# Patient Record
Sex: FEMALE | Race: WHITE
[De-identification: names, ages, dates, MRNs, and addresses within clinical notes are randomized per-mention and may not be internally consistent; named-entity substitution may affect disease eponyms.]

---

## 2017-04-01 ENCOUNTER — HOSPITAL ENCOUNTER (INPATIENT)
Dept: HOSPITAL 47 - EC | Age: 67
LOS: 4 days | Discharge: HOME | DRG: 69 | End: 2017-04-05
Payer: COMMERCIAL

## 2017-04-01 VITALS — BODY MASS INDEX: 41.3 KG/M2

## 2017-04-01 DIAGNOSIS — Z90.710: ICD-10-CM

## 2017-04-01 DIAGNOSIS — Z90.49: ICD-10-CM

## 2017-04-01 DIAGNOSIS — E78.5: ICD-10-CM

## 2017-04-01 DIAGNOSIS — H02.401: ICD-10-CM

## 2017-04-01 DIAGNOSIS — Z82.49: ICD-10-CM

## 2017-04-01 DIAGNOSIS — R20.0: ICD-10-CM

## 2017-04-01 DIAGNOSIS — J30.2: ICD-10-CM

## 2017-04-01 DIAGNOSIS — I10: ICD-10-CM

## 2017-04-01 DIAGNOSIS — Z79.899: ICD-10-CM

## 2017-04-01 DIAGNOSIS — I44.1: ICD-10-CM

## 2017-04-01 DIAGNOSIS — Z87.19: ICD-10-CM

## 2017-04-01 DIAGNOSIS — Z87.898: ICD-10-CM

## 2017-04-01 DIAGNOSIS — R60.0: ICD-10-CM

## 2017-04-01 DIAGNOSIS — R06.00: ICD-10-CM

## 2017-04-01 DIAGNOSIS — R29.810: ICD-10-CM

## 2017-04-01 DIAGNOSIS — E66.9: ICD-10-CM

## 2017-04-01 DIAGNOSIS — H53.8: ICD-10-CM

## 2017-04-01 DIAGNOSIS — Z80.9: ICD-10-CM

## 2017-04-01 DIAGNOSIS — G45.9: Primary | ICD-10-CM

## 2017-04-01 DIAGNOSIS — Z87.891: ICD-10-CM

## 2017-04-01 DIAGNOSIS — T44.7X5A: ICD-10-CM

## 2017-04-01 LAB
ALP SERPL-CCNC: 143 U/L (ref 38–126)
ALT SERPL-CCNC: 17 U/L (ref 9–52)
ANION GAP SERPL CALC-SCNC: 11 MMOL/L
AST SERPL-CCNC: 25 U/L (ref 14–36)
BASOPHILS # BLD AUTO: 0 K/UL (ref 0–0.2)
BASOPHILS NFR BLD AUTO: 1 %
BUN SERPL-SCNC: 17 MG/DL (ref 7–17)
CALCIUM SPEC-MCNC: 10 MG/DL (ref 8.4–10.2)
CH: 31.1
CHCM: 33.6
CHLORIDE SERPL-SCNC: 107 MMOL/L (ref 98–107)
CO2 SERPL-SCNC: 26 MMOL/L (ref 22–30)
EOSINOPHIL # BLD AUTO: 0.1 K/UL (ref 0–0.7)
EOSINOPHIL NFR BLD AUTO: 3 %
ERYTHROCYTE [DISTWIDTH] IN BLOOD BY AUTOMATED COUNT: 4.75 M/UL (ref 3.8–5.4)
ERYTHROCYTE [DISTWIDTH] IN BLOOD: 13 % (ref 11.5–15.5)
GLUCOSE SERPL-MCNC: 98 MG/DL (ref 74–99)
HCT VFR BLD AUTO: 44.2 % (ref 34–46)
HDW: 2.6
HGB BLD-MCNC: 14.7 GM/DL (ref 11.4–16)
INR PPP: 1 (ref ?–1.1)
LUC NFR BLD AUTO: 3 %
LYMPHOCYTES # SPEC AUTO: 1.1 K/UL (ref 1–4.8)
LYMPHOCYTES NFR SPEC AUTO: 23 %
MCH RBC QN AUTO: 30.8 PG (ref 25–35)
MCHC RBC AUTO-ENTMCNC: 33.1 G/DL (ref 31–37)
MCV RBC AUTO: 93.1 FL (ref 80–100)
MONOCYTES # BLD AUTO: 0.4 K/UL (ref 0–1)
MONOCYTES NFR BLD AUTO: 7 %
NEUTROPHILS # BLD AUTO: 3.2 K/UL (ref 1.3–7.7)
NEUTROPHILS NFR BLD AUTO: 64 %
NON-AFRICAN AMERICAN GFR(MDRD): 57
POTASSIUM SERPL-SCNC: 5.1 MMOL/L (ref 3.5–5.1)
PROT SERPL-MCNC: 7.5 G/DL (ref 6.3–8.2)
PT BLD: 9.8 SEC (ref 9–12)
SODIUM SERPL-SCNC: 144 MMOL/L (ref 137–145)
WBC # BLD AUTO: 0.13 10*3/UL
WBC # BLD AUTO: 5 K/UL (ref 3.8–10.6)
WBC (PEROX): 5

## 2017-04-01 PROCEDURE — 83519 RIA NONANTIBODY: CPT

## 2017-04-01 PROCEDURE — 36415 COLL VENOUS BLD VENIPUNCTURE: CPT

## 2017-04-01 PROCEDURE — 80061 LIPID PANEL: CPT

## 2017-04-01 PROCEDURE — 85025 COMPLETE CBC W/AUTO DIFF WBC: CPT

## 2017-04-01 PROCEDURE — 85610 PROTHROMBIN TIME: CPT

## 2017-04-01 PROCEDURE — 80048 BASIC METABOLIC PNL TOTAL CA: CPT

## 2017-04-01 PROCEDURE — 80053 COMPREHEN METABOLIC PANEL: CPT

## 2017-04-01 PROCEDURE — 86255 FLUORESCENT ANTIBODY SCREEN: CPT

## 2017-04-01 PROCEDURE — 93880 EXTRACRANIAL BILAT STUDY: CPT

## 2017-04-01 PROCEDURE — 93005 ELECTROCARDIOGRAM TRACING: CPT

## 2017-04-01 PROCEDURE — 70551 MRI BRAIN STEM W/O DYE: CPT

## 2017-04-01 PROCEDURE — 70544 MR ANGIOGRAPHY HEAD W/O DYE: CPT

## 2017-04-01 PROCEDURE — 93306 TTE W/DOPPLER COMPLETE: CPT

## 2017-04-01 PROCEDURE — 95819 EEG AWAKE AND ASLEEP: CPT

## 2017-04-01 PROCEDURE — 84484 ASSAY OF TROPONIN QUANT: CPT

## 2017-04-01 PROCEDURE — 99285 EMERGENCY DEPT VISIT HI MDM: CPT

## 2017-04-01 PROCEDURE — 70450 CT HEAD/BRAIN W/O DYE: CPT

## 2017-04-01 PROCEDURE — 84443 ASSAY THYROID STIM HORMONE: CPT

## 2017-04-01 RX ADMIN — ASPIRIN 81 MG CHEWABLE TABLET SCH MG: 81 TABLET CHEWABLE at 16:53

## 2017-04-01 RX ADMIN — HYDROCHLOROTHIAZIDE SCH MG: 25 TABLET ORAL at 16:53

## 2017-04-01 RX ADMIN — FAMOTIDINE SCH MG: 20 TABLET, FILM COATED ORAL at 20:29

## 2017-04-01 NOTE — CONS
Mrs. Mario is a 66-year-old female with known history of 

hypertension, hyperlipidemia, who presented with a droopy right eye. 

Cardiology consultation was requested because of episode of 

bradycardia and Wenckebach. Patient denies any dizziness or 

palpitation. She has some mild dyspnea on exertion and some peripheral 

edema at the time. She has no chest discomfort. No PND. No orthopnea. 

She has no prior cardiac history. She was seen recently by Dr. Live 

and she was scheduled to undergo a stress test as an outpatient. She 

had no prior history of stroke but she had Bell palsy in the past. She 

had no associated focal weakness. Her coronary risk factors are 

remarkable for hypertension and hyperlipidemia. She has stopped 

smoking many years ago. She is nondiabetic.  



Her medications include: 

1. Clonidine 0.1 mg daily. 

2. Amlodipine 10 mg daily. 

3. Simvastatin 40 mg daily. 

4. Metoprolol succinate 100 mg daily. 

5. Losartan 100 mg daily. 

6. Loratadine and 

7. Vitamin D. 



REVIEW OF SYSTEMS:

RESPIRATORY: No recent wheezing. No cough. No history of obstructive 

lung disease.  

GI: No recent GI bleeding. No peptic ulcer disease. 

: No dysuria or hematuria. 

NERVOUS: No prior history of stroke or seizure. 



PHYSICAL EXAMINATION: A 66-year-old female; alert, oriented, in no 

apparent distress. Blood pressure 135/70 with a heart rate in the 50s. 

Head with ptosis on the right side.  

EYES: Sclerae anicteric. 

NECK: Good carotid upstroke. No bruit. No jugular venous distention. 

LUNGS: Clear to auscultation. 

HEART: Regular rate and rhythm. S1, S2. No S3 with systolic murmur at 

the base 1/6. No diastolic murmur. No rub.  

ABDOMEN: Soft, nontender. Positive bowel sounds. No organomegaly. 

EXTREMITIES: No edema. Intact distal pulses. 



Lab data revealed BUN and creatinine of 17 and 0.9. Troponin less than 

0.012. Hemoglobin of 14.7. EKG revealed a sinus mechanism with a 

first-degree AV block, rate of 50. Rhythm strip revealed episode of 

Wenckebach. Carotid duplex scan performed showed no evidence of 

high-grade stenosis. CT scan of the chest revealed no acute changes.  



IMPRESSION: 

1. Episode of Wenckebach and first-degree atrioventricular block, most 

likely exacerbated by the beta blocker.  

2. Right eye drooping, possible transient ischemic attack. Workup in 

progress.  

3. History of hypertension. 

4. Hyperlipidemia. 



RECOMMENDATIONS: From the cardiac standpoint, I will decrease the dose 

of her beta blocker and I will add to her regimen a diuretic. Will 

obtain an echocardiogram and Doppler, follow her blood pressure and 

her rhythm. It is very likely that her bradycardia is induced by the 

medication. I will start her on aspirin once a day and depending on 

the results of testing, further recommendations will be made. Thank 

you for this consult. Will follow with you.

## 2017-04-01 NOTE — ED
General Adult HPI





- General


Chief complaint: Eye Problems


Stated complaint: rt side visual disturbance, facial numbness


Time Seen by Provider: 04/01/17 10:08


Source: patient


Mode of arrival: ambulatory


Limitations: no limitations





- History of Present Illness


Initial comments: 





Patient complains of right-sided weakness as well as facial droop.  She states 

that her symptoms began last night.  She has no chest pain, belly pain, back 

pain.  She has no nausea or vomiting.  She has taken no medication for the 

symptoms.  She has no change in her hearing.  She has no neck pain or 

stiffness.  She has no headache.  She has no weakness or trouble walking.  She 

has no lightheadedness or dizziness.





- Related Data


 Home Medications











 Medication  Instructions  Recorded  Confirmed


 


Cyanocobalamin (Vitamin B-12) 2,000 mcg PO SA 05/25/16 05/31/16





[Vitamin B-12]   


 


Loratadine [Loratadine] 10 mg PO DAILY 05/25/16 05/25/16


 


Losartan Potassium [Cozaar] 100 mg PO QAM 05/25/16 05/25/16


 


Naproxen Sodium [Aleve] 220 mg PO DAILY 05/25/16 05/25/16


 


Simvastatin [Zocor] 20 mg PO DAILY 05/25/16 05/25/16


 


amLODIPine BESYLATE [Amlodipine 5 mg PO QAM 05/25/16 05/25/16





Besylate]   











 Allergies











Allergy/AdvReac Type Severity Reaction Status Date / Time


 


No Known Allergies Allergy   Verified 04/01/17 11:52














Review of Systems


ROS Statement: 


Those systems with pertinent positive or pertinent negative responses have been 

documented in the HPI.





ROS Other: All systems not noted in ROS Statement are negative.





Past Medical History


Past Medical History: Hypertension, Liver Disease


Additional Past Medical History / Comment(s): SEASONAL ALLERGIES


History of Any Multi-Drug Resistant Organisms: None Reported


Past Surgical History: Hysterectomy, Orthopedic Surgery


Additional Past Surgical History / Comment(s): RT ANKLE


Past Anesthesia/Blood Transfusion Reactions: No Reported Reaction


Past Psychological History: No Psychological Hx Reported


Smoking Status: Former smoker


Past Alcohol Use History: None Reported


Additional Past Alcohol Use History / Comment(s): SMOKED IN TEENS FOR A COUPLE 

YEARS


Past Drug Use History: None Reported





General Exam


Limitations: no limitations


General appearance: alert, in no apparent distress


Head exam: Present: atraumatic, normocephalic, normal inspection


Eye exam: Present: normal appearance, PERRL, EOMI.  Absent: scleral icterus, 

conjunctival injection, periorbital swelling


ENT exam: Present: normal exam, mucous membranes moist


Neck exam: Present: normal inspection.  Absent: tenderness, meningismus, 

lymphadenopathy


Respiratory exam: Present: normal lung sounds bilaterally.  Absent: respiratory 

distress, wheezes, rales, rhonchi, stridor


Cardiovascular Exam: Present: regular rate, normal rhythm, normal heart sounds.

  Absent: systolic murmur, diastolic murmur, rubs, gallop, clicks


GI/Abdominal exam: Present: soft, normal bowel sounds.  Absent: distended, 

tenderness, guarding, rebound, rigid


Extremities exam: Present: normal inspection, full ROM, normal capillary 

refill.  Absent: tenderness, pedal edema, joint swelling, calf tenderness


Back exam: Present: normal inspection


Neurological exam: Present: alert, oriented X3, other (Slight weakness on the 

right side with facial droop)


Psychiatric exam: Present: normal affect, normal mood


Skin exam: Present: warm, dry, intact, normal color.  Absent: rash





Course





 Vital Signs











  04/01/17 04/01/17





  09:50 10:52


 


Temperature 97.0 F L 


 


Pulse Rate 64 59 L


 


Respiratory 18 14





Rate  


 


Blood Pressure 193/89 130/69


 


O2 Sat by Pulse 96 





Oximetry  














EKG Findings





- EKG Comments:


EKG Findings:: Twelve-lead EKG obtained, interpreted by me showing ventricular 

rate 80 bpm, slightly prolonged DE interval, normal QRS complexes, no ST 

elevation or depression, interpreted by me as sinus bradycardia with first-

degree AV block.





Medical Decision Making





- Medical Decision Making





Patient presents with right-sided weakness, facial droop.  CT shows chronic 

ischemic changes.  I'm concerned for TIA, and the possibility of a severe 

stroke in the near future.  She will require admission to the hospital.





- Lab Data


Result diagrams: 


 04/01/17 10:28





 04/01/17 10:28





 Lab Results











  04/01/17 04/01/17 04/01/17 Range/Units





  10:28 10:28 10:28 


 


WBC  5.0    (3.8-10.6)  k/uL


 


RBC  4.75    (3.80-5.40)  m/uL


 


Hgb  14.7    (11.4-16.0)  gm/dL


 


Hct  44.2    (34.0-46.0)  %


 


MCV  93.1    (80.0-100.0)  fL


 


MCH  30.8    (25.0-35.0)  pg


 


MCHC  33.1    (31.0-37.0)  g/dL


 


RDW  13.0    (11.5-15.5)  %


 


Plt Count  261    (150-450)  k/uL


 


Neutrophils %  64    %


 


Lymphocytes %  23    %


 


Monocytes %  7    %


 


Eosinophils %  3    %


 


Basophils %  1    %


 


Neutrophils #  3.2    (1.3-7.7)  k/uL


 


Lymphocytes #  1.1    (1.0-4.8)  k/uL


 


Monocytes #  0.4    (0-1.0)  k/uL


 


Eosinophils #  0.1    (0-0.7)  k/uL


 


Basophils #  0.0    (0-0.2)  k/uL


 


PT    9.8  (9.0-12.0)  sec


 


INR    1.0  (<1.1)  


 


Sodium   144   (137-145)  mmol/L


 


Potassium   5.1   (3.5-5.1)  mmol/L


 


Chloride   107   ()  mmol/L


 


Carbon Dioxide   26   (22-30)  mmol/L


 


Anion Gap   11   mmol/L


 


BUN   17   (7-17)  mg/dL


 


Creatinine   0.98   (0.52-1.04)  mg/dL


 


Est GFR (MDRD) Af Amer   >60   (>60 ml/min/1.73 sqM)  


 


Est GFR (MDRD) Non-Af   57   (>60 ml/min/1.73 sqM)  


 


Glucose   98   (74-99)  mg/dL


 


Calcium   10.0   (8.4-10.2)  mg/dL


 


Total Bilirubin   0.8   (0.2-1.3)  mg/dL


 


AST   25   (14-36)  U/L


 


ALT   17   (9-52)  U/L


 


Alkaline Phosphatase   143 H   ()  U/L


 


Troponin I     (0.000-0.034)  ng/mL


 


Total Protein   7.5   (6.3-8.2)  g/dL


 


Albumin   4.3   (3.5-5.0)  g/dL














  04/01/17 Range/Units





  10:28 


 


WBC   (3.8-10.6)  k/uL


 


RBC   (3.80-5.40)  m/uL


 


Hgb   (11.4-16.0)  gm/dL


 


Hct   (34.0-46.0)  %


 


MCV   (80.0-100.0)  fL


 


MCH   (25.0-35.0)  pg


 


MCHC   (31.0-37.0)  g/dL


 


RDW   (11.5-15.5)  %


 


Plt Count   (150-450)  k/uL


 


Neutrophils %   %


 


Lymphocytes %   %


 


Monocytes %   %


 


Eosinophils %   %


 


Basophils %   %


 


Neutrophils #   (1.3-7.7)  k/uL


 


Lymphocytes #   (1.0-4.8)  k/uL


 


Monocytes #   (0-1.0)  k/uL


 


Eosinophils #   (0-0.7)  k/uL


 


Basophils #   (0-0.2)  k/uL


 


PT   (9.0-12.0)  sec


 


INR   (<1.1)  


 


Sodium   (137-145)  mmol/L


 


Potassium   (3.5-5.1)  mmol/L


 


Chloride   ()  mmol/L


 


Carbon Dioxide   (22-30)  mmol/L


 


Anion Gap   mmol/L


 


BUN   (7-17)  mg/dL


 


Creatinine   (0.52-1.04)  mg/dL


 


Est GFR (MDRD) Af Amer   (>60 ml/min/1.73 sqM)  


 


Est GFR (MDRD) Non-Af   (>60 ml/min/1.73 sqM)  


 


Glucose   (74-99)  mg/dL


 


Calcium   (8.4-10.2)  mg/dL


 


Total Bilirubin   (0.2-1.3)  mg/dL


 


AST   (14-36)  U/L


 


ALT   (9-52)  U/L


 


Alkaline Phosphatase   ()  U/L


 


Troponin I  <0.012  (0.000-0.034)  ng/mL


 


Total Protein   (6.3-8.2)  g/dL


 


Albumin   (3.5-5.0)  g/dL














Disposition


Clinical Impression: 


 TIA (transient ischemic attack)





Disposition: ADMITTED AS IP TO THIS [facility]


Condition: Fair


Time of Disposition: 11:56

## 2017-04-01 NOTE — CT
EXAMINATION TYPE: CT brain wo con

 

DATE OF EXAM: 4/1/2017 11:09 AM

 

COMPARISON: NONE

 

HISTORY: Rt eye visual disturbance and drooping

 

CT DLP: 1090.4 mGycm

Automated exposure control for dose reduction was used.

 

FINDINGS: 

Central structures are midline. There is no evidence of hydrocephalus. There is diffuse periventricul
ar white matter lucency, compatible with chronic ischemic change. There is no mass effect, midline sh
ift or intracranial blood.

 

There is mucoperiosteal thickening involving the maxillary and ethmoid sinuses.

 

IMPRESSION: 

1. NO ACUTE INTRACRANIAL ABNORMALITY.

2. CHRONIC WHITE MATTER ISCHEMIC CHANGE.

3. CHRONIC SINUS MUCOSAL THICKENING INVOLVING THE MAXILLARY AND ETHMOID SINUSES.

## 2017-04-01 NOTE — P.CNNES
History of Present Illness


Consult date: 04/01/17


Reason for Consult: This patient being evaluated for right eye ptosis and 

stroke.


History of Present Illness: 





This patient is a 66-year-old right hand white female who states that yesterday 

evening she noted some right-sided facial numbness and weakness.  She states 

she awoke this morning and had difficulty opening her right eyelid.  She was 

concerned as to this condition as she has never experienced this previously.  

She initially thought maybe there was an eye infection.  Her symptoms did not 

improve and there was no evidence of any discharge around the right eyelid.  

She did not experience any headache or weakness.  She denied any right-sided 

arm or leg weakness.  She decided to come to the emergency room for further 

evaluation.  She was seen in the ER at [facility] by Dr. Muñoz.

  She was sent for a computed tomography scan of the brain which revealed no 

acute intracranial abnormality.  His chronic white matter ischemic changes 

noted.  EKG in the ER revealed the patient to have first-degree AV block.  When 

questioned about the right sided facial droop yesterday she states this morning 

she did not experience any facial drooping.  Patient denies any previous 

history of TIA or stroke.  She does have a remote history of left-sided Bell's 

palsy years ago.  She is being followed by her primary care physician and 

recently had evaluation and apparently all of her laboratory test results were 

within normal limits.  She also follows with Dr. Mora in the cardiology clinic 

and was scheduled to undergo a stress test in the next few days.  The patient 

is now admitted and neurology has been consulted for further evaluation and 

recommendations.





Review of Systems


Constitutional: Denies chills, Denies fever


Eyes: denies blurred vision, denies pain


Ears, nose, mouth and throat: Denies headache, Denies sore throat


Cardiovascular: Denies chest pain, Denies shortness of breath


Respiratory: Denies cough


Gastrointestinal: Denies abdominal pain, Denies diarrhea, Denies nausea, Denies 

vomiting


Genitourinary: Denies dysuria, Denies hematuria


Musculoskeletal: Denies myalgias


Integumentary: Denies pruritus, Denies rash


Neurological: Denies numbness, Denies weakness


Psychiatric: Denies anxiety, Denies depression


Endocrine: Denies fatigue, Denies weight change





Past Medical History


Past Medical History: Hypertension, Liver Disease


Additional Past Medical History / Comment(s): SEASONAL ALLERGIES, BELLS PALSY 

2002,


History of Any Multi-Drug Resistant Organisms: None Reported


Past Surgical History: Cholecystectomy, Hysterectomy, Orthopedic Surgery


Additional Past Surgical History / Comment(s): RT ANKLE Surgery,


Past Anesthesia/Blood Transfusion Reactions: No Reported Reaction


Past Psychological History: No Psychological Hx Reported


Smoking Status: Never smoker


Past Alcohol Use History: None Reported


Additional Past Alcohol Use History / Comment(s): SMOKED IN TEENS FOR A COUPLE 

YEARS


Past Drug Use History: None Reported





- Past Family History


  ** Father


Family Medical History: Congestive Heart Failure (CHF), Coronary Artery Disease 

(CAD), Hypertension





  ** Mother


Family Medical History: Cancer, Hypertension





Medications and Allergies


 Home Medications











 Medication  Instructions  Recorded  Confirmed  Type


 


Loratadine [Loratadine] 10 mg PO DAILY 05/25/16 04/01/17 History


 


Losartan Potassium [Cozaar] 100 mg PO DAILY 05/25/16 04/01/17 History


 


Ergocalciferol [Vitamin D2] 50,000 unit PO CARRILLO 04/01/17 04/01/17 History


 


Metoprolol Succinate (ER) [Toprol 100 mg PO DAILY 04/01/17 04/01/17 History





Xl]    


 


Simvastatin [Zocor] 40 mg PO DAILY 04/01/17 04/01/17 History


 


amLODIPine [Norvasc] 10 mg PO DAILY 04/01/17 04/01/17 History


 


cloNIDine HCL [Catapres] 0.1 mg PO DAILY 04/01/17 04/01/17 History











 Allergies











Allergy/AdvReac Type Severity Reaction Status Date / Time


 


No Known Allergies Allergy   Verified 04/01/17 11:52














Physical Examination





- Vital Signs


Vital Signs: 


 Vital Signs











  Temp Pulse Pulse Resp BP BP Pulse Ox


 


 04/01/17 16:00  97.2 F L   54 L  16   135/79  95


 


 04/01/17 15:01  97.2 F L   54 L  16   135/79  95


 


 04/01/17 13:52  97.1 F L  51 L   17  142/73  


 


 04/01/17 12:00  98.2 F  50 L   14  142/73   99








 Intake and Output











 04/01/17 04/01/17 04/01/17





 06:59 14:59 22:59


 


Intake Total   10


 


Output Total   300


 


Balance   -290


 


Intake:   


 


  IV   10


 


    Invasive Line 1   10


 


Output:   


 


  Urine   300


 


Other:   


 


  Weight   106 kg








 Patient Weight











 04/02/17





 06:59


 


Weight 106 kg














- Constitutional


General appearance: average body habitus, cooperative, obese





- EENT


EENT: PERRL, mucous membranes moist





- Respiratory


Respiratory: lungs clear, normal breath sounds





- Cardiovascular


Cardiovascular: regular rate, normal S1, normal S2


Extremities: no peripheral edema bilaterally





- Gastrointestinal


Gastrointestinal: normoactive bowel sounds





- Integumentary


Integumentary: normal





- Neurologic


Cranial nerve examination: PERRL (Patient with right eyelid ptosis on 

examination), EOMI, ptosis (Right eyelid ptosis), V1/V2/V3 grossly intact, face 

symmetric, tongue midline, intact gag reflex, intact corneal reflex, normal 

palatal elevation


Speech examination: intact


Sensorimotor examination: intact


Detailed motor examination: grossly full strength in all extremities


Motor examination - right side: 5/5: biceps, triceps, wrist flexion, wrist 

extension, , hip flexors, knee extensors, dorsiflexion, toe extension (EHL)

, plantarflexion


Motor examination - left side: 5/5: biceps, triceps, wrist flexion, wrist 

extension, , hip flexors, knee extensors, dorsiflexion, toe extension (EHL)

, plantarflexion


Detailed sensory examination: intact


Reflex and gait examination: intact


Reflexes: 1+: ankle, bicep, knee, tricep





- Musculoskeletal


Musculoskeletal: no pain





- Psychiatric


Psychiatric: mood/affect appropriate, cooperative





Results





- Laboratory Findings


CBC and BMP: 


 04/01/17 10:28





 04/01/17 10:28





Assessment and Plan


(1) Ptosis, right eyelid


Status: Acute   Code(s): H02.401 - UNSPECIFIED PTOSIS OF RIGHT EYELID   





(2) First degree AV block


Status: Acute   Code(s): I44.0 - ATRIOVENTRICULAR BLOCK, FIRST DEGREE   





(3) Hyperlipidemia


Status: Acute   Code(s): E78.5 - HYPERLIPIDEMIA, UNSPECIFIED   





(4) TIA (transient ischemic attack)


Status: Acute   Code(s): G45.9 - TRANSIENT CEREBRAL ISCHEMIC ATTACK, 

UNSPECIFIED   


Plan: 





This patient is a 66-year-old female who apparently yesterday evening developed 

some right-sided facial droop.  This morning she awoke and noted that she had a 

droopy right eyelid.  She did not experience any facial droop this morning.  

She denied any right arm or leg weakness.  She was brought into the emergency 

room at [facility] for further evaluation today.  She was seen 

in the ER by Dr. Muñoz.  She was sent for computed tomography scan of the brain 

which was negative for any acute abnormality.  She was admitted for possible 

TIA.  She denies any right facial weakness or right sided hemiparesis today.  

She has been more concerned with the droopy eyelid on the right side.  She 

denies any headache at this time.  Her neurological examination is nonfocal at 

this time.  She has no previous history of diabetes or TIA or strokelike 

symptoms.  We would recommend placing this patient on 1 aspirin daily for 

secondary stroke prevention.  We would recommend a complete stroke workup for 

the patient.  We will obtain an MRI of the brain for further evaluation.  Would 

also recommend a cardiology and ophthalmology consultation for this patient.  

Her overall prognosis at this time remains very guarded.  We will continue 

close neurological follow-up of this patient during this admission.


Time with Patient: Greater than 30

## 2017-04-01 NOTE — US
EXAMINATION TYPE: US carotid duplex BILAT

 

DATE OF EXAM: 4/1/2017 2:11 PM

 

COMPARISON: NONE

 

CLINICAL HISTORY: Pain. Left eye droop.  Hx of bells palsy in other side x 15 years ago per patient. 


 

EXAM MEASUREMENTS: 

 

RIGHT:  Peak Systolic Velocity (PSV) cm/sec

----- Right CCA:  54.9  

----- Right ICA:  58.4     

----- Right ECA:  70.3   

ICA/CCA ratio:  1.1    

 

RIGHT:  End Diastole cm/sec

----- Right CCA:  17.4   

----- Right ICA:  19.1      

----- Right ECA:  13.6     

 

LEFT:  Peak Systolic Velocity (PSV) cm/sec

----- Left CCA:  68.6  

----- Left ICA:  40.9   

----- Left ECA:  46.7  

ICA/CCA ratio:  0.6  

 

LEFT:  End Diastole cm/sec

----- Left CCA:  17.1  

----- Left ICA:  9.6   

----- Left ECA:  9.7 

 

VERTEBRALS (direction of flow):

Right Vertebral: Antegrade

Left Vertebral: Antegrade

 

No significant stenosis or high velocities. Plaque seen in right posterior bulb. Bilateral wall thick
ening.   

 

IMPRESSION:  I DO NOT SEE A HEMODYNAMICALLY SIGNIFICANT STENOSIS IN EITHER CAROTID SYSTEM.   

 

 

Criteria for Assigning % of Stenosis / Diameter reduction

(Estimation based on the indirect measurements of the internal carotid artery velocities (ICA PSV).

1.  Normal (no stenosis)=ICA PSV < 125 cm/s: ratio < 2.0: ICA EDV<40 cm/s.

2. Less than 50% stenosis=ICA PSV < 125 cm/s: ratio < 2.0: ICA EDV<40 cm/s.

3.  50 to 69% stenosis=ICA PSV of 125 to 230 cm/s: ration 2.0 ? 4.0: ICA EDV  cm/s.

4.  Greater than 70% stenosis to near occlusion= ICA PSV > 230 cm/s: ratio > 4.0: ICA EDV > 100 cm/s.
 

5.  Near occlusion= ICA PSV velocities may be low or undetectable: variable ratio and ICA EDV.

6.  Total occlusion=unable to detect flow.

## 2017-04-02 LAB
ANION GAP SERPL CALC-SCNC: 12 MMOL/L
BUN SERPL-SCNC: 15 MG/DL (ref 7–17)
CALCIUM SPEC-MCNC: 10.1 MG/DL (ref 8.4–10.2)
CHLORIDE SERPL-SCNC: 105 MMOL/L (ref 98–107)
CHOLEST SERPL-MCNC: 164 MG/DL (ref ?–200)
CO2 SERPL-SCNC: 25 MMOL/L (ref 22–30)
GLUCOSE SERPL-MCNC: 105 MG/DL (ref 74–99)
HDLC SERPL-MCNC: 68 MG/DL (ref 40–60)
NON-AFRICAN AMERICAN GFR(MDRD): 55
POTASSIUM SERPL-SCNC: 4.8 MMOL/L (ref 3.5–5.1)
SODIUM SERPL-SCNC: 142 MMOL/L (ref 137–145)
TRIGL SERPL-MCNC: 136 MG/DL (ref ?–150)

## 2017-04-02 RX ADMIN — ASPIRIN 81 MG CHEWABLE TABLET SCH MG: 81 TABLET CHEWABLE at 09:29

## 2017-04-02 RX ADMIN — LOSARTAN POTASSIUM SCH MG: 50 TABLET, FILM COATED ORAL at 09:30

## 2017-04-02 RX ADMIN — FAMOTIDINE SCH MG: 20 TABLET, FILM COATED ORAL at 09:30

## 2017-04-02 RX ADMIN — ENOXAPARIN SODIUM SCH MG: 40 INJECTION SUBCUTANEOUS at 09:29

## 2017-04-02 RX ADMIN — METOPROLOL SUCCINATE SCH MG: 100 TABLET, EXTENDED RELEASE ORAL at 09:32

## 2017-04-02 RX ADMIN — FAMOTIDINE SCH MG: 20 TABLET, FILM COATED ORAL at 20:40

## 2017-04-02 RX ADMIN — ATORVASTATIN CALCIUM SCH MG: 20 TABLET, FILM COATED ORAL at 09:30

## 2017-04-02 RX ADMIN — HYDROCHLOROTHIAZIDE SCH MG: 25 TABLET ORAL at 09:31

## 2017-04-02 NOTE — P.PN
Subjective





This patient is a 66-year-old female who was admitted yesterday to Hospital 

with symptoms of right eyelid ptosis and blurring of vision.  Patient initially 

had some symptoms of right facial numbness and questionable weakness.  She was 

brought into the emergency room where she was evaluated by Dr. Muñoz.  She 

underwent a computed tomography scan of the brain which was reported negative 

for any acute changes.  She was admitted to hospital for further evaluation of 

TIA.  Patient also developed evidence of bradycardia and AV block.  She was 

seen by cardiology for this condition.  She had an episode of blinking block 

and first-degree AV block likely felt to be secondary to beta blocker use.  Her 

dose of beta blocker has been reduced.  She is being followed closely by 

cardiology.  Patient presented with right eyelid ptosis.  We have recommended 

MRI of the brain to rule out brainstem ischemia.  We have also recommended 

ophthalmology consultation which is still pending today.  Patient states she 

was seen by the ophthalmologist today and we are awaiting his assessment and 

recommendations.  Patient is to be maintained on aspirin for secondary stroke 

prevention.  Cardiology is following the patient for first-degree heart block.  

Her cardiac medications are being adjusted by Dr. Potter.  Patient does show 

slight improvement in the degree of ptosis of the right eyelid.  We will 

continue close neurological follow-up for this patient during this admission.





Objective





- Vital Signs


Vital signs: 


 Vital Signs











Temp  97.1 F L  04/02/17 11:41


 


Pulse  64   04/02/17 11:41


 


Resp  16   04/02/17 11:41


 


BP  136/93   04/02/17 11:41


 


Pulse Ox  93 L  04/02/17 11:41








 Intake & Output











 04/01/17 04/02/17 04/02/17





 18:59 06:59 18:59


 


Intake Total 10 300 100


 


Output Total 300 400 


 


Balance -290 -100 100


 


Weight 106 kg 104.1 kg 


 


Intake:   


 


  IV 10  


 


    Invasive Line 1 10  


 


  Oral  300 100


 


Output:   


 


  Urine 300 400 


 


Other:   


 


  # Voids  1 0














- Exam





Physical examination:





PHYSICAL EXAMINATION: Patient is resting comfortably in bed. 


VITAL SIGNS: Blood pressure is [136/93]. Heart rate is [64]. Respiration is [16]

. Temperature is [97.1].


HEENT: Head is atraumatic, neck is supple, there were no carotid bruits.


CHEST: Lungs are clear to auscultation and percussion.  


CARDIAC: S1, S2 normal rate and rhythm. There is no murmur.


ABDOMEN: Soft and nontender. Bowel sounds are present.


EXTREMITIES:  There is no pedal edema.  Peripheral pulses are present.





Neurological examination:





Patient's neurological examination is unchanged from yesterday.





- Labs


CBC & Chem 7: 


 04/01/17 10:28





 04/02/17 06:12


Labs: 


 Abnormal Lab Results - Last 24 Hours (Table)











  04/02/17 Range/Units





  06:12 


 


Glucose  105 H  (74-99)  mg/dL


 


HDL Cholesterol  68 H  (40-60)  mg/dL














Assessment and Plan


(1) Ptosis, right eyelid


Status: Acute   Code(s): H02.401 - UNSPECIFIED PTOSIS OF RIGHT EYELID   





(2) First degree AV block


Status: Acute   Code(s): I44.0 - ATRIOVENTRICULAR BLOCK, FIRST DEGREE   





(3) Hyperlipidemia


Status: Acute   Code(s): E78.5 - HYPERLIPIDEMIA, UNSPECIFIED   





(4) TIA (transient ischemic attack)


Status: Acute   Code(s): G45.9 - TRANSIENT CEREBRAL ISCHEMIC ATTACK, 

UNSPECIFIED   


Plan: 





This patient is a 66-year-old female being evaluated for acute right eyelid 

ptosis and possible vertebrobasilar ischemia.  She is to undergo MRI of the 

brain tomorrow for further evaluation.  She was seen by ophthalmology today and 

we are waiting there findings and recommendations.  Patient notices slight 

improvement with the degree of ptosis in the right eye.  She states that it 

does seem to wax and wane throughout the day.  She denies any diplopia.  We 

will continue close neurological follow-up for this patient during this 

admission.  Hopefully MRI of the brain will be completed tomorrow and will be 

reviewed.  Overall prognosis at this time remains guarded.

## 2017-04-02 NOTE — P.HPIM
History of Present Illness


H&P Date: 04/02/17


Chief Complaint: Acute stroke





Patient is a 66-year-old female patient of Dr. Awilda Flannery who presented to 

[facility] after experiencing right sided facial numbness and 

weakness and having difficulty opening her right eyelid she was evaluated in 

the emergency room computed tomography scan of the brain did not reveal any 

acute intracranial abnormality patient had chronic white matter ischemic 

changes patient was admitted to telemetry floor for possible TIA.


Patient has known history of hypertension and hyperlipidemia treated with 

medications, no previous history of stroke or TIA in the past, no history of 

smoking no history of diabetes, patient has a history of left sided Bell's 

palsy 15 years ago.





Past Medical History


Past Medical History: Hypertension, Liver Disease


Additional Past Medical History / Comment(s): SEASONAL ALLERGIES, BELLS PALSY 

2002,


History of Any Multi-Drug Resistant Organisms: None Reported


Past Surgical History: Cholecystectomy, Hysterectomy, Orthopedic Surgery


Additional Past Surgical History / Comment(s): RT ANKLE Surgery,


Past Anesthesia/Blood Transfusion Reactions: No Reported Reaction


Past Psychological History: No Psychological Hx Reported


Smoking Status: Never smoker


Past Alcohol Use History: None Reported


Additional Past Alcohol Use History / Comment(s): SMOKED IN TEENS FOR A COUPLE 

YEARS


Past Drug Use History: None Reported





- Past Family History


  ** Father


Family Medical History: Congestive Heart Failure (CHF), Coronary Artery Disease 

(CAD), Hypertension





  ** Mother


Family Medical History: Cancer, Hypertension





Medications and Allergies


 Home Medications











 Medication  Instructions  Recorded  Confirmed  Type


 


Loratadine [Loratadine] 10 mg PO DAILY 05/25/16 04/01/17 History


 


Losartan Potassium [Cozaar] 100 mg PO DAILY 05/25/16 04/01/17 History


 


Ergocalciferol [Vitamin D2] 50,000 unit PO CARRILLO 04/01/17 04/01/17 History


 


Metoprolol Succinate (ER) [Toprol 100 mg PO DAILY 04/01/17 04/01/17 History





Xl]    


 


Simvastatin [Zocor] 40 mg PO DAILY 04/01/17 04/01/17 History


 


amLODIPine [Norvasc] 10 mg PO DAILY 04/01/17 04/01/17 History


 


cloNIDine HCL [Catapres] 0.1 mg PO DAILY 04/01/17 04/01/17 History











 Allergies











Allergy/AdvReac Type Severity Reaction Status Date / Time


 


No Known Allergies Allergy   Verified 04/01/17 11:52














Physical Exam


Vitals: 


 Vital Signs











  Temp Pulse Pulse Resp BP BP Pulse Ox


 


 04/02/17 04:00  97.0 F L   60  16   167/81  95


 


 04/02/17 00:00    54 L  18   158/72  93 L


 


 04/01/17 20:00  97.1 F L   56 L  16   141/75  93 L


 


 04/01/17 16:00  97.2 F L   54 L  16   135/79  95


 


 04/01/17 15:01  97.2 F L   54 L  16   135/79  95


 


 04/01/17 13:52  97.1 F L  51 L   17  142/73  


 


 04/01/17 12:00  98.2 F  50 L   14  142/73   99








 Intake and Output











 04/01/17 04/02/17 04/02/17





 22:59 06:59 14:59


 


Intake Total 10 300 100


 


Output Total 700  


 


Balance -690 300 100


 


Intake:   


 


  IV 10  


 


    Invasive Line 1 10  


 


  Oral 0 300 100


 


Output:   


 


  Urine 700  


 


Other:   


 


  # Voids  1 0


 


  Weight 106 kg 104.1 kg 














In general patient is alert and oriented 3 in no apparent distress


HEENT head normocephalic and atraumatic there is partial opening of the right 

eye lid patient is unable to open her right eye related completely, and when 

she helps the her upper right eyelid output with the use of her hand she 

complains of blurred vision in the right eye.


Neck is supple no JVD no goiter no lymphadenopathy no carotid bruit


Chest exam reveals a few scattered crackles no wheezing


Cardiac exam reveals regular heart sounds S1 and S2 no gallops no murmurs


Abdomen is soft nontender no organomegaly


Extremity exam reveals no edema no cyanosis or clubbing


Neurological examination there is right eye upper eyelid ptosis, there is also 

blurred vision in the right eye


Otherwise rest of cranial nerves are intact


No motor or sensory deficit


Reflexes are 2+ symmetric





Results


CBC & Chem 7: 


 04/01/17 10:28





 04/02/17 06:12


Labs: 


 Abnormal Lab Results - Last 24 Hours (Table)











  04/02/17 Range/Units





  06:12 


 


Glucose  105 H  (74-99)  mg/dL


 


HDL Cholesterol  68 H  (40-60)  mg/dL














Thrombosis Risk Factor Assmnt





- Choose All That Apply


Each Factor Represents 1 point: Obesity (BMI >25)


Each Risk Factor Represents 2 Points: Age 61-74 years


Other congenital or acquired thrombophilia - If yes, enter type in comment: No


Thrombosis Risk Factor Assessment Total Risk Factor Score: 3


Thrombosis Risk Factor Assessment Level: Moderate Risk





Assessment and Plan


Plan: 





#1 acute stroke with right upper eyelid ptosis


#2 underlying history of hypertension


#3 underlying history of hyperlipidemia


#4 cardiac arrhythmia in the form of first-degree AV block and second degree 

Wenckebach


#5 remote history of Bell's palsy on the left side


#6 blurred vision in the right eye





At this time echocardiogram and carotid Doppler were ordered patient was 

started on aspirin


Cardiology and neurology consultation are following


MRI of the brain is ordered but not performed yet


Will ask ophthalmology to evaluate


Continue with current management otherwise


Prognosis is guarded

## 2017-04-02 NOTE — P.CON
Consult Note





- .


Consult date: 04/02/17


Assessment/Plan:: 





This is a 65 y/o female who presented herself to the emergency room after 

awakening with a right eyelid ptosis without diplopia and facial droop. 

According to she and her daughter the facial weakness was limited to the eyelid 

and the periocular area. There was no claim of other musculoskeletal weakness, 

loss of sensation, inhibition of speech, vertigo, tinnitus or difficulty 

recalling her surroundings. She felt her vision on the right was slightly 

poorer than on the left. 





She states that she underwent a dilated eye examination in February for safety 

glasses, as she's working in a plastics factory, and denies any noted problems 

by the optometrist who performd the examination. She also had a physical 

examination with her medical physician Dr. Flannery, and the problem noted at 

that time was elevated blood pressure which has been difficult to treat. She 

was referred to cardiology for further assessment and intervention as needed. 

An obtained EKG was normal as she states. And she does not recall any prior 

history of any cardiac conduction defects. 





Her past ocular history is largely unremarkable except for the requirement to 

have glasses for work. She denies any previous history of amblyopia, surgery, 

glaucoma or retinal problems. She had a left sided Bell's palsy some years ago 

which did not require even the use of artificial tears or other intervention to 

control for drying of the ocular surfaces, as often happens. She does note 

there is a mild residual effect of a slightly wider appearing eye on the left 

side. She admits to feeling somewhat more tired after a hard and hot day at 

work more so than on cooler days





On examination she is a pleasant woman who is well aware of her surroundings 

and able to provide a coherent history. She is cooperative with the examination 

as well. 





Vision with current glasses was 20/25 OD, and 20/20 OS. IOP 8 mm Hg OD, 10 mm 

Hg OS @1200 hrs. Pupils -APD, 4 to 3 mm both direct and consensual. EOM full D&V

, orthophoric


Confrontational fields full bilaterally. Eyelids, ptosis partial is noted on 

the right, Fissues are 4 mm right 7 mm left. MRD -1 mm OD, +3 mm OS. Good upper 

lid crease no excessive retraction and levator function 13 mm right, 17 mm 

left. There is noted weakness on spontaneous lifting of the eyelid on the right 

and slight weakness of closure on the left. No fatiguing of the eyelid muscles 

was noted when attempting to be opened while be held closed, and no increased 

weakness noted on closure while being held open. There was no improvement in 

eyelid ptosis when an ice cube was held against the eyelid for 90 seconds. The 

balance of CN II to CN VII appeared otherwise to be normal in function.





A nondilated examination of the eye was performed. 





Cornea: clear OU


AC: D&Q OU


Iris: similar in pigmentation and without obvious pathology


Lens: clear


Vitreous: Clear


Optic nerve: Sharp, flat & pink


C:D: 0.20 OU


Macula: quiet


Vascular: Normal


Peripheral: grossly normal, without pigmentary changes





Assessment: Ptosis right upper eyelid acute, No obvious signs of underlying 

cause There does not seem to be any additional CN III involvement, or proximal 

limb weakness. She did not respond to ice to eyelid if there was some question 

of myasthenia gravis, however admittedly this is not the definitive test. I am 

not detecting any signs of CPEO and therefore not really considering [facility]

[place] here. I suspect this to possibly be a form of dehissence and measurements 

for this are better made in the office. 





Plan: Proceed with workup of the underlying problem, better control of systemic 

issues, and will follow while hospitalized.

## 2017-04-02 NOTE — PN
Mrs. Mario is a 66-year-old female who presented with symptoms of 

drooping in the right eye and mild blurriness.  She had first degree 

AV block and an episode of Wenckebach.  Her beta blocker dose was 

decreased. She still has some drooping of the eye. She is denying any 

chest pain.  



Her breathing has been stable. She has been ambulating without 

difficulty. Had no further episode of bradycardia. She has been 

continued on: 

1. Aspirin.

2. Lipitor 20 mg daily.

3. Hydrochlorothiazide 25 mg daily.

4. Losartan 100 mg daily.

5. Metoprolol succinate 50 mg daily.

6. Amlodipine 10 mg daily.

7. Clonidine 0.1 mg daily. 



PHYSICAL EXAMINATION: Blood pressure 136/90 with a heart rate in the 

60s.  

LUNGS: Clear. 

HEART: Regular rate and rhythm. S1, S2, no S3, no rub.  With a 

systolic murmur.  

ABDOMEN: Soft, nontender. 

EXTREMITIES: No edema. 



Lab data revealed BUN and creatinine 15 and 1.01. Her potassium 4.8. 



IMPRESSION:

1. Drooping of the right eye with blurriness, work-up in progress. 

2. Hypertension. 

3. First degree AV block and episode of Wenckebach improved with 

cutting down the dose of beta blocker.  

4. Hypertension. 

5. Hyperlipidemia. 



RECOMMENDATIONS: Will continue to monitor her blood pressure. If her 

pressure is stable, then I will stop her clonidine continue medical 

regimen. We will obtain an echocardiogram with Doppler and depending 

on her progress, further recommendation will be made.

## 2017-04-03 RX ADMIN — METOPROLOL SUCCINATE SCH MG: 100 TABLET, EXTENDED RELEASE ORAL at 08:31

## 2017-04-03 RX ADMIN — LOSARTAN POTASSIUM SCH MG: 50 TABLET, FILM COATED ORAL at 08:30

## 2017-04-03 RX ADMIN — ENOXAPARIN SODIUM SCH MG: 40 INJECTION SUBCUTANEOUS at 08:30

## 2017-04-03 RX ADMIN — HYDROCHLOROTHIAZIDE SCH MG: 25 TABLET ORAL at 08:30

## 2017-04-03 RX ADMIN — ATORVASTATIN CALCIUM SCH MG: 20 TABLET, FILM COATED ORAL at 08:29

## 2017-04-03 RX ADMIN — FAMOTIDINE SCH MG: 20 TABLET, FILM COATED ORAL at 08:30

## 2017-04-03 RX ADMIN — ASPIRIN 81 MG CHEWABLE TABLET SCH MG: 81 TABLET CHEWABLE at 08:29

## 2017-04-03 NOTE — MR
EXAMINATION TYPE: MR brain wo con

 

DATE OF EXAM: 4/3/2017 8:08 PM

 

COMPARISON: 11/21/2012

 

HISTORY: Right eye ptosis

 

Standard multiplanar, multisequence MRI departmental protocol

 

Multiplanar, multisequence images of the brain were acquired. Diffusion weighted imaging was performe
d.  

 

FINDINGS: There are patchy areas of abnormal increased signal in the white matter around the occipita
l horns of the lateral ventricles. There is to lesser extent white matter foci that measure up to 5 m
m of increased signal around the remainder of the lateral ventricles. The brainstem is intact. There 
is no evidence of a posterior fossa mass. Sella turcica appears normal. Corpus callosum appears intac
t. The globes are symmetric. There is no evidence of an orbital mass. There are scattered high signal
 white matter foci on the T2 and FLAIR images at the gray-white matter junction of both temporal lobe
s and both occipital lobes.

 

IMPRESSION: 

There are numerous white matter high signal foci in both cerebral hemispheres. I would consider both 
demyelinating disease and chronic small vessel ischemia. There is slight progression of disease hayden
red to the previous MR scan of 11/21/2012. Total number of white matter lesions is more than 30. No e
vidence of a cortical infarct.

## 2017-04-03 NOTE — P.PN
Subjective


Principal diagnosis: 





Right eye ptosis





Patient is a 66-year-old female presenting with right eye ptosis, and blurred 

vision in the right eye


Cause is still unclear


Patient was evaluated by neurology and ophthalmology


She is scheduled for MRI today at 7 PM





Objective





- Vital Signs


Vital signs: 


 Vital Signs











Temp  97.5 F L  04/03/17 08:00


 


Pulse  66   04/03/17 12:00


 


Resp  16   04/03/17 12:00


 


BP  115/58   04/03/17 12:00


 


Pulse Ox  93 L  04/03/17 12:00








 Intake & Output











 04/02/17 04/03/17 04/03/17





 18:59 06:59 18:59


 


Intake Total 240 310 400


 


Output Total   250


 


Balance 240 310 150


 


Weight  103.8 kg 


 


Intake:   


 


  IV 10 10 


 


    Invasive Line 2 10 10 


 


  Oral 230 300 400


 


Output:   


 


  Urine   250


 


Other:   


 


  # Voids 1 1 














- Exam





HEENT head normocephalic and atraumatic, there is ptosis in the right eye, 

patient states she has blurry vision in her right eye


Neck is supple no JVD no goiter no lymphadenopathy


Chest is clear to auscultation no wheezing


Cardiac exam reveals regular heart sounds no murmurs


Abdomen is soft nontender no organomegaly


Extremity exam reveals no edema





- Labs


CBC & Chem 7: 


 04/01/17 10:28





 04/02/17 06:12





Assessment and Plan


Plan: 





#1 acute stroke with right upper eyelid ptosis


#2 underlying history of hypertension


#3 underlying history of hyperlipidemia


#4 cardiac arrhythmia in the form of first-degree AV block and second degree 

Wenckebach


#5 remote history of Bell's palsy on the left side


#6 blurred vision in the right eye





At this time echocardiogram and carotid Doppler were ordered patient was 

started on aspirin


Cardiology and neurology consultation are following


MRI of the brain is scheduled for 7 PM today


Input from Dr. Torres ophthalmologist review


Continue with current management otherwise


Prognosis is guarded

## 2017-04-03 NOTE — MR
EXAMINATION TYPE: MR angio head wo con

 

DATE OF EXAM: 4/3/2017 8:09 PM

 

COMPARISON: NONE

 

HISTORY: Right eyelid ptosis

 

TECHNIQUE: Time of flight images focusing on the [place] were performed without contrast.

 

FINDINGS: Internal carotid arteries bifurcate normally into A1 and M1 segments. Middle cerebral arter
ies branch normally without evidence of aneurysm. Distal middle cerebral artery branches appear unrem
arkable. A2 segments appear unremarkable.

 

Anterior communicating artery is patent. The right posterior communicating artery is patent. Left pos
terior communicating artery is not identified.

 

Vertebral basilar system appears normal. Posterior cerebral vasculature appears normal.

 

IMPRESSION: 

Normal MRA [place].

## 2017-04-03 NOTE — P.PN
Subjective


Principal diagnosis: 


Second-degree type I heart block


This is a 66-year-old female who presented to the hospital with right eye 

droopiness and mild blurred vision.  Patient was noted to have a first-degree 

AV block on the monitor and an episode of wide keep.  She was seen in 

consultation by Dr. Potter.  Echocardiogram with Doppler study was performed 

which revealed normal left ventricular systolic function.  Patient has been up 

ambulating in the hallway today without any difficulty.  Remaining in normal 

sinus rhythm with first-degree AV block heart rate in the 60s.  Blood pressure 

126/90.  We will discontinue the clonidine, continue her other medications.








Objective





- Vital Signs


Vital signs: 


 Vital Signs











Temp  97.5 F L  04/03/17 08:00


 


Pulse  66   04/03/17 12:00


 


Resp  16   04/03/17 12:00


 


BP  115/58   04/03/17 12:00


 


Pulse Ox  93 L  04/03/17 12:00








 Intake & Output











 04/02/17 04/03/17 04/03/17





 18:59 06:59 18:59


 


Intake Total 240 310 400


 


Output Total   250


 


Balance 240 310 150


 


Weight  103.8 kg 


 


Intake:   


 


  IV 10 10 


 


    Invasive Line 2 10 10 


 


  Oral 230 300 400


 


Output:   


 


  Urine   250


 


Other:   


 


  # Voids 1 1 














- Exam





PHYSICAL EXAMINATION: 





HEENT: Head is atraumatic, normocephalic.  Pupils equal, round.  Drooping of 

the right eyelid noted  Neck is supple.  There is no elevated jugular venous 

pressure.





HEART EXAMINATION: Heart S1, S2 normal.  No murmur or gallop heard.





CHEST EXAMINATION: Lungs are clear to auscultation and precussion. No chest 

wall tenderness is noted on palpation or with deep breathing.





ABDOMEN:  Soft, nontender. Bowel sounds are heard. No organomegaly noted.


 


EXTREMITIES: 2+ peripheral pulses with no evidence of peripheral edema and no 

calf tenderness noted.





NEUROLOGIC patient is awake, alert and oriented -3.


 


.


 








- Labs


CBC & Chem 7: 


 04/01/17 10:28





 04/02/17 06:12





Assessment and Plan


(1) Wenckebach


Status: Acute   





(2) First degree AV block


Status: Acute   





(3) Hyperlipidemia


Status: Acute   





(4) Ptosis, right eyelid


Status: Acute   


Plan: 


Cardiology's perspective, we will discontinue the clonidine.  Continue other 

medications.  On discharge a follow-up appointment will be made with Dr. Potter 

in the office.








DNP note has been reviewed, I agree with a documented findings and plan of 

care.  Patient was seen and examined.

## 2017-04-03 NOTE — ECHOF
Referral Reason:htn



MEASUREMENTS

--------

HEIGHT: 160.0 cm

WEIGHT: 103.9 kg

BP: 163/89

RVIDd:   3.1 cm     (< 3.3)

IVSd:   1.0 cm     (0.6 - 1.1)

LVIDd:   3.7 cm     (3.9 - 5.3)

LVPWd:   1.2 cm     (0.6 - 1.1)

IVSs:   1.5 cm

LVIDs:   3.5 cm

LVPWs:   1.1 cm

LA Diam:   3.3 cm     (2.7 - 3.8)

LAESV Index (A-L):   25.93 ml/m

Ao Diam:   3.4 cm     (2.0 - 3.7)

AV Cusp:   1.5 cm     (1.5 - 2.6)

LA Diam:   3.7 cm     (2.7 - 3.8)

MV EXCURSION:   14.751 mm     (> 18.000)

MV EF SLOPE:   66 mm/s     (70 - 150)

EPSS:   0.4 cm

RAP:   5.00 mmHg

RVSP:   12.04 mmHg







FINDINGS

--------

Sinus rhythm.

This was a technically adequate study.

There is mild concentric left ventricular hypertrophy.  

 Overall left ventricular systolic function is 

low-normal with, an EF between 50 - 55 %.

The right ventricle is normal in size.

Normal LA  size by volume 22+/-6 ml/m2.

The right atrial size is normal.

There is mild aortic valve sclerosis.   There is no 

evidence of aortic regurgitation.

Mild mitral annular calcification present.   Mild 

mitral regurgitation is present.

Mild tricuspid regurgitation present.   There is no 

evidence of pulmonary hypertension.   The right 

ventricular systolic pressure, as measured by Doppler, 

is 12.04mmHg.

There is no pulmonic regurgitation present.

The aortic root size is normal.

There is no pericardial effusion.



CONCLUSIONS

--------

1. There is mild concentric left ventricular hypertrophy.

2. Overall left ventricular systolic function is 

low-normal with, an EF between 50 - 55 %.

3. There is mild aortic valve sclerosis.

4. Mild mitral annular calcification present.

5. Mild mitral regurgitation is present.

6. Mild tricuspid regurgitation present.

7. There is no evidence of pulmonary hypertension.

8. The right ventricular systolic pressure, as measured by 

Doppler, is 12.04mmHg.





SONOGRAPHER: Joan Shah RDCS

## 2017-04-03 NOTE — P.PN
Subjective





This patient is a 66-year-old female who was admitted yesterday to Hospital 

with symptoms of right eyelid ptosis and blurring of vision.  Patient initially 

had some symptoms of right facial numbness and questionable weakness.  She was 

brought into the emergency room where she was evaluated by Dr. Muñoz.  She 

underwent a computed tomography scan of the brain which was reported negative 

for any acute changes.  She was admitted to hospital for further evaluation of 

TIA.  Patient also developed evidence of bradycardia and AV block.  She was 

seen by cardiology for this condition.  She had an episode of blinking block 

and first-degree AV block likely felt to be secondary to beta blocker use.  Her 

dose of beta blocker has been reduced.  She is being followed closely by 

cardiology.  Patient presented with right eyelid ptosis.  We have recommended 

MRI of the brain to rule out brainstem ischemia.  We have also recommended 

ophthalmology consultation which is still pending today.  Patient states she 

was seen by the ophthalmologist today and we are awaiting his assessment and 

recommendations.  Patient is to be maintained on aspirin for secondary stroke 

prevention.  Cardiology is following the patient for first-degree heart block.  

Her cardiac medications are being adjusted by Dr. Potter.  Patient does show 

slight improvement in the degree of ptosis of the right eyelid.  Patient was 

able to complete MRI of the brain today.  Results of the MRI indicate scattered 

white matter ischemic changes bilaterally.  No evidence of acute stroke.  Brain 

STEM is normal with no evidence of brainstem stroke.  MRA of the [place] is reported normal.  Results of the MRI MRA were reviewed today with the 

patient.  We have reviewed the recommendations from ophthalmology.  It is felt 

her findings more likely are due to local weakness of the right eye ocular 

muscles and will require outpatient ophthalmology follow-up.  Patient should be 

maintained on aspirin therapy for secondary stroke prevention.  We will 

continue close neurological follow-up for this patient during this admission.





Objective





- Vital Signs


Vital signs: 


 Vital Signs











Temp  97.0 F L  04/03/17 20:00


 


Pulse  68   04/03/17 20:00


 


Resp  16   04/03/17 20:00


 


BP  135/74   04/03/17 20:00


 


Pulse Ox  94 L  04/03/17 20:00








 Intake & Output











 04/03/17 04/03/17 04/04/17





 06:59 18:59 06:59


 


Intake Total 310 637 


 


Output Total  250 


 


Balance 310 387 


 


Weight 103.8 kg  


 


Intake:   


 


  IV 10  


 


    Invasive Line 2 10  


 


  Oral 300 637 


 


Output:   


 


  Urine  250 


 


Other:   


 


  Voiding Method   Toilet


 


  # Voids 1  














- Exam





Physical examination:





PHYSICAL EXAMINATION: Patient is resting comfortably in bed. 


VITAL SIGNS: Blood pressure is [135/74]. Heart rate is [68]. Respiration is [16]

. Temperature is [97.0].


HEENT: Head is atraumatic, neck is supple, there were no carotid bruits.


CHEST: Lungs are clear to auscultation and percussion.  


CARDIAC: S1, S2 normal rate and rhythm. There is no murmur.


ABDOMEN: Soft and nontender. Bowel sounds are present.


EXTREMITIES:  There is no pedal edema.  Peripheral pulses are present.





Neurological examination:





Patient's neurological examination is unchanged from yesterday.





- Labs


CBC & Chem 7: 


 04/01/17 10:28





 04/02/17 06:12





Assessment and Plan


(1) Ptosis, right eyelid


Status: Acute   Code(s): H02.401 - UNSPECIFIED PTOSIS OF RIGHT EYELID   





(2) First degree AV block


Status: Acute   Code(s): I44.0 - ATRIOVENTRICULAR BLOCK, FIRST DEGREE   





(3) Hyperlipidemia


Status: Acute   Code(s): E78.5 - HYPERLIPIDEMIA, UNSPECIFIED   





(4) TIA (transient ischemic attack)


Status: Acute   Code(s): G45.9 - TRANSIENT CEREBRAL ISCHEMIC ATTACK, 

UNSPECIFIED   


Plan: 





This patient is a 66-year-old female who was admitted hospital with right 

eyelid ptosis.  Patient underwent MRI/MRA of the brain today the results of 

which are noted above.  MRI is negative for acute brainstem stroke or acute 

cortical stroke at this time.  Multiple chronic white matter ischemic changes 

are seen bilaterally.  Patient is recommended to continue on aspirin therapy 

for secondary stroke prevention.  MRA is normal with no evidence of any 

aneurysm.  We reviewed the results of the MRI/MRA with the patient today.  She 

is recommended to continue to follow-up with ophthalmology.  Ophthalmology 

consultation was noted today.  There is possibility that this may be a 

localized muscle this is since involving the superior orbital muscle of the 

right eye.  She is to follow-up with ophthalmology in outpatient clinic.  We 

will continue close neurological follow-up for this patient.  Overall prognosis 

at this time remains guarded.

## 2017-04-03 NOTE — P.PN
Subjective


Principal diagnosis: 





Right eye ptosis





67 y/o female with acute onset of right upper lid ptosis admitted for 

cerebrovascular accident etiology.





On exam to day she is not noting any improvement in the eyelid position and 

feels the "blurry" vision is related to the eyelid not completely getting out 

of her way.





Va w/ correction: 20/25 OD, 20/20 OS


eyelids Ptosis OD with upper lid crease of 12-13 mm, OS lid crease ~10 mm


EOM full, orthophoric


Pupils, -APD. 





Assessment: Right upper lid ptosis, pretty unlikely to be completely isolated 

from the balance of the 3rd nerve. there are no bulbar signs. Suspect this is 

more likely a mechanical and can be repaired when cleared of any underlying 

issues. 





Plan: continue to follow and will see on discharge. 





Objective





- Vital Signs


Vital signs: 


 Vital Signs











Temp  97.1 F L  04/03/17 04:00


 


Pulse  67   04/03/17 04:00


 


Resp  18   04/03/17 04:00


 


BP  163/89   04/03/17 04:00


 


Pulse Ox  94 L  04/03/17 04:00








 Intake & Output











 04/02/17 04/03/17 04/03/17





 18:59 06:59 18:59


 


Intake Total 240 310 220


 


Balance 240 310 220


 


Weight  103.8 kg 


 


Intake:   


 


  IV 10 10 


 


    Invasive Line 2 10 10 


 


  Oral 230 300 220


 


Other:   


 


  # Voids 1 1 














- Labs


CBC & Chem 7: 


 04/01/17 10:28





 04/02/17 06:12

## 2017-04-04 RX ADMIN — HYDROCHLOROTHIAZIDE SCH MG: 25 TABLET ORAL at 10:08

## 2017-04-04 RX ADMIN — LOSARTAN POTASSIUM SCH MG: 50 TABLET, FILM COATED ORAL at 10:07

## 2017-04-04 RX ADMIN — ASPIRIN 81 MG CHEWABLE TABLET SCH MG: 81 TABLET CHEWABLE at 10:08

## 2017-04-04 RX ADMIN — METOPROLOL SUCCINATE SCH MG: 100 TABLET, EXTENDED RELEASE ORAL at 10:08

## 2017-04-04 RX ADMIN — ATORVASTATIN CALCIUM SCH MG: 20 TABLET, FILM COATED ORAL at 10:07

## 2017-04-04 RX ADMIN — FAMOTIDINE SCH MG: 20 TABLET, FILM COATED ORAL at 10:08

## 2017-04-04 RX ADMIN — ENOXAPARIN SODIUM SCH MG: 40 INJECTION SUBCUTANEOUS at 10:07

## 2017-04-04 NOTE — P.PN
Subjective


Principal diagnosis: 


Second-degree type I heart block


This is a 66-year-old female who presented to the hospital with right eye 

droopiness and mild blurred vision.  Patient was noted to have a first-degree 

AV block on the monitor and an episode of wide keep.  She was seen in 

consultation by Dr. Potter.  Echocardiogram with Doppler study was performed 

which revealed normal left ventricular systolic function.  Patient has been up 

ambulating in the hallway today without any difficulty.  Remaining in normal 

sinus rhythm with first-degree AV block heart rate in the 60s.  Blood pressure 

112/60.  She may be able to be discharged home once cleared by the primary.  We 

will follow her with you now on an as-needed basis only please don't hesitate 

to call with any questions





Objective





- Vital Signs


Vital signs: 


 Vital Signs











Temp  97.0 F L  04/04/17 08:00


 


Pulse  82   04/04/17 11:33


 


Resp  16   04/04/17 11:33


 


BP  112/60   04/04/17 08:00


 


Pulse Ox  97   04/04/17 08:00








 Intake & Output











 04/03/17 04/04/17 04/04/17





 18:59 06:59 18:59


 


Intake Total 637  240


 


Output Total 250  250


 


Balance 387  -10


 


Weight  102.7 kg 102.7 kg


 


Intake:   


 


  Oral 637  240


 


Output:   


 


  Urine 250  250


 


Other:   


 


  Voiding Method  Toilet Toilet


 


  # Voids  1 1














- Exam





PHYSICAL EXAMINATION: 





HEENT: Head is atraumatic, normocephalic.  Pupils equal, round.  Drooping of 

the right eyelid noted  Neck is supple.  There is no elevated jugular venous 

pressure.





HEART EXAMINATION: Heart S1, S2 normal.  No murmur or gallop heard.





CHEST EXAMINATION: Lungs are clear to auscultation and precussion. No chest 

wall tenderness is noted on palpation or with deep breathing.





ABDOMEN:  Soft, nontender. Bowel sounds are heard. No organomegaly noted.


 


EXTREMITIES: 2+ peripheral pulses with no evidence of peripheral edema and no 

calf tenderness noted.





NEUROLOGIC patient is awake, alert and oriented -3.


 


.


 








- Labs


CBC & Chem 7: 


 04/01/17 10:28





 04/02/17 06:12





Assessment and Plan


(1) [place]


Status: Acute   





(2) First degree AV block


Status: Acute   





(3) Hyperlipidemia


Status: Acute   





(4) Ptosis, right eyelid


Status: Acute   


Plan: 


Cardiology's perspective, to continue current medications that the patient is 

on.  Heart rate remained stable.  We will follow her with you now on an as-

needed basis only, please don't hesitate to call with any questions.  A follow-

up appointment will be made with Dr. Potter in the office post discharge.








DNP note has been reviewed, I agree with a documented findings and plan of 

care.  Patient was seen and examined.

## 2017-04-04 NOTE — P.PN
Subjective





This patient is a 66-year-old female who was admitted yesterday to Hospital 

with symptoms of right eyelid ptosis and blurring of vision.  Patient initially 

had some symptoms of right facial numbness and questionable weakness.  She was 

brought into the emergency room where she was evaluated by Dr. Muñoz.  She 

underwent a computed tomography scan of the brain which was reported negative 

for any acute changes.  She was admitted to hospital for further evaluation of 

TIA.  Patient also developed evidence of bradycardia and AV block.  She was 

seen by cardiology for this condition.  She had an episode of blinking block 

and first-degree AV block likely felt to be secondary to beta blocker use.  Her 

dose of beta blocker has been reduced.  She is being followed closely by 

cardiology.  Patient presented with right eyelid ptosis.  We have recommended 

MRI of the brain to rule out brainstem ischemia.  We have also recommended 

ophthalmology consultation which is still pending today.  Patient states she 

was seen by the ophthalmologist today and we are awaiting his assessment and 

recommendations.  Patient is to be maintained on aspirin for secondary stroke 

prevention.  Cardiology is following the patient for first-degree heart block.  

Her cardiac medications are being adjusted by Dr. Potter.  Patient does show 

slight improvement in the degree of ptosis of the right eyelid.  Patient was 

able to complete MRI of the brain today.  Results of the MRI indicate scattered 

white matter ischemic changes bilaterally.  No evidence of acute stroke.  

Brainstem is normal with no evidence of brainstem stroke.  MRA of the [place] is reported normal.  Results of the MRI MRA were reviewed today with the 

patient.  We have reviewed the recommendations from ophthalmology.  It is felt 

her findings more likely are due to local weakness of the right eye ocular 

muscles and will require outpatient ophthalmology follow-up.  Patient should be 

maintained on aspirin therapy for secondary stroke prevention.  After review of 

her MRI of the brain there is evidence of multiple white matter changes.  These 

are felt to be arterial sclerotic and vascular in nature.  This may be related 

to her history of uncontrolled hypertension in the past.  We would recommend 

the patient to be placed on Plavix 75 mg daily for secondary stroke prevention.

  She may discontinue the aspirin therapy.  Patient may follow-up in the 

outpatient neurology clinic in 3-4 weeks.  We will continue close neurological 

follow-up for this patient during this admission.





Objective





- Vital Signs


Vital signs: 


 Vital Signs











Temp  97.0 F L  04/04/17 08:00


 


Pulse  88   04/04/17 15:57


 


Resp  16   04/04/17 15:57


 


BP  137/74   04/04/17 15:57


 


Pulse Ox  96   04/04/17 15:57








 Intake & Output











 04/03/17 04/04/17 04/04/17





 18:59 06:59 18:59


 


Intake Total 637  1600


 


Output Total 250  250


 


Balance 387  1350


 


Weight  102.7 kg 102.7 kg


 


Intake:   


 


  Oral 637  1600


 


Output:   


 


  Urine 250  250


 


Other:   


 


  Voiding Method  Toilet Toilet


 


  # Voids  1 0














- Exam





Physical examination:





PHYSICAL EXAMINATION: Patient is resting comfortably in bed. 


VITAL SIGNS: Blood pressure is [135/74]. Heart rate is [88]. Respiration is [16]

. Temperature is [97.0].


HEENT: Head is atraumatic, neck is supple, there were no carotid bruits.


CHEST: Lungs are clear to auscultation and percussion.  


CARDIAC: S1, S2 normal rate and rhythm. There is no murmur.


ABDOMEN: Soft and nontender. Bowel sounds are present.


EXTREMITIES:  There is no pedal edema.  Peripheral pulses are present.





Neurological examination:





Patient's neurological examination is unchanged from yesterday.  Patient 

continues to demonstrate ptosis of the right eyelid.





- Labs


CBC & Chem 7: 


 04/01/17 10:28





 04/02/17 06:12





Assessment and Plan


(1) Ptosis, right eyelid


Status: Acute   Code(s): H02.401 - UNSPECIFIED PTOSIS OF RIGHT EYELID   





(2) First degree AV block


Status: Acute   Code(s): I44.0 - ATRIOVENTRICULAR BLOCK, FIRST DEGREE   





(3) Hyperlipidemia


Status: Acute   Code(s): E78.5 - HYPERLIPIDEMIA, UNSPECIFIED   





(4) TIA (transient ischemic attack)


Status: Acute   Code(s): G45.9 - TRANSIENT CEREBRAL ISCHEMIC ATTACK, 

UNSPECIFIED   


Plan: 





This patient is a 66-year-old female who was admitted hospital with right 

eyelid ptosis.  Patient underwent MRI/MRA of the brain today the results of 

which are noted above.  MRI is negative for acute brainstem stroke or acute 

cortical stroke at this time.  Multiple chronic white matter ischemic changes 

are seen bilaterally.  Patient is recommended to continue on aspirin therapy 

for secondary stroke prevention.  MRA is normal with no evidence of any 

aneurysm.  We reviewed the results of the MRI/MRA with the patient today.  She 

is recommended to continue to follow-up with ophthalmology.  Ophthalmology 

consultation was noted today.  There is possibility that this may be a 

localized muscle this is since involving the superior orbital muscle of the 

right eye.  She is to follow-up with ophthalmology in outpatient clinic.  We 

did review the MRI films today.  There is multiple white matter changes felt to 

be ischemic in nature and chronic.  We would recommend the patient to start on 

Plavix 75 mg daily for secondary stroke prevention.  We would recommend tight 

control of her blood pressure.  She may follow-up in the outpatient neurology 

clinic in 3-4 weeks.  We will continue close neurological follow-up for this 

patient.  Overall prognosis at this time remains guarded.

## 2017-04-05 VITALS — TEMPERATURE: 98 F | HEART RATE: 86 BPM | SYSTOLIC BLOOD PRESSURE: 116 MMHG | DIASTOLIC BLOOD PRESSURE: 77 MMHG

## 2017-04-05 VITALS — RESPIRATION RATE: 16 BRPM

## 2017-04-05 RX ADMIN — ATORVASTATIN CALCIUM SCH MG: 20 TABLET, FILM COATED ORAL at 08:27

## 2017-04-05 RX ADMIN — HYDROCHLOROTHIAZIDE SCH MG: 25 TABLET ORAL at 08:28

## 2017-04-05 RX ADMIN — FAMOTIDINE SCH MG: 20 TABLET, FILM COATED ORAL at 08:26

## 2017-04-05 RX ADMIN — ENOXAPARIN SODIUM SCH MG: 40 INJECTION SUBCUTANEOUS at 08:27

## 2017-04-05 RX ADMIN — METOPROLOL SUCCINATE SCH MG: 100 TABLET, EXTENDED RELEASE ORAL at 08:27

## 2017-04-05 RX ADMIN — LOSARTAN POTASSIUM SCH MG: 50 TABLET, FILM COATED ORAL at 08:28

## 2017-04-05 NOTE — P.DS
Providers


Date of admission: 


04/01/17 11:56





Expected date of discharge: 04/05/17


Attending physician: 


Miki Constantino





Consults: 





 





04/01/17 15:57


Consult Physician Routine 


   Consulting Provider: Maureen Potter


   Consult Reason/Comments: 1 degree to 2nd degree heart block, roxie


   Do you want consulting provider notified?: Yes





04/02/17 08:00


Consult Physician Routine 


   Consulting Provider: Marco Antonio Torres


   Consult Reason/Comments: Right eyelid ptosis, acute.


   Do you want consulting provider notified?: Yes











Primary care physician: 


[facility] Course: 





This patient is a 66-year-old right hand white female who states that yesterday 

evening she noted some right-sided facial numbness and weakness.  She states 

she awoke this morning and had difficulty opening her right eyelid.  She was 

concerned as to this condition as she has never experienced this previously.  

She initially thought maybe there was an eye infection.  Her symptoms did not 

improve and there was no evidence of any discharge around the right eyelid.  

She did not experience any headache or weakness. 





Throughout this admission patient continued to have ptosis of her right eyelid 

she was also complaining of blurry vision in the right eye


Otherwise she denied any complaint





She was evaluated by neurology Dr. Bruce


She was also evaluated by ophthalmology Dr. Torres


She underwent MRI of the brain and MRA of the [place]


She and underwent echocardiogram and carotid Doppler





She was evaluated by cardiology and her blood pressure medication were changed 

to Norvasc and hydrochlorothiazide


Clonidine was discontinued





She was also evaluated by neurology and Aspirin and Plavix were added to her 

regimen





Exact etiology of patient's symptoms is not entirely clear, please review full 

consults of Dr. Torres and Dr. Bruce for details


Patient should follow up with both of them in the next 1-2 weeks


She he should also follow-up with her primary care physician at [facility] within 1-2 weeks





Patient Condition at Discharge: Fair





Plan - Discharge Summary


Discharge Medication List





Loratadine 10 mg PO DAILY 05/25/16 [History]


Losartan Potassium [Cozaar] 100 mg PO DAILY 05/25/16 [History]


Ergocalciferol [Vitamin D2 (DRISDOL)] 50,000 unit PO CARRILLO 04/01/17 [History]


Metoprolol Succinate (ER) [Toprol XL] 100 mg PO DAILY 04/01/17 [History]


Simvastatin [Zocor] 40 mg PO DAILY 04/01/17 [History]


amLODIPine [Norvasc] 10 mg PO DAILY 04/01/17 [History]


Clopidogrel [Plavix] 75 mg PO DAILY  tab 04/05/17 [Rx]


Hydrochlorothiazide [Hydrodiuril] 25 mg PO DAILY  tab 04/05/17 [Rx]


amLODIPine [Norvasc] 10 mg PO DAILY  tab 04/05/17 [Rx]








Follow up Appointment(s)/Referral(s): 


Maureen Potter MD [STAFF PHYSICIAN] - 2 Weeks


Awilda Flannery DO [Primary Care Provider] - 1-2 days

## 2017-04-05 NOTE — P.PN
Subjective





This patient is a 66-year-old female who was admitted yesterday to Hospital 

with symptoms of right eyelid ptosis and blurring of vision.  Patient initially 

had some symptoms of right facial numbness and questionable weakness.  She was 

brought into the emergency room where she was evaluated by Dr. Muñoz.  She 

underwent a computed tomography scan of the brain which was reported negative 

for any acute changes.  She was admitted to hospital for further evaluation of 

TIA.  Patient also developed evidence of bradycardia and AV block.  She was 

seen by cardiology for this condition.  She had an episode of blinking block 

and first-degree AV block likely felt to be secondary to beta blocker use.  Her 

dose of beta blocker has been reduced.  She is being followed closely by 

cardiology.  Patient presented with right eyelid ptosis.  We have recommended 

MRI of the brain to rule out brainstem ischemia.  We have also recommended 

ophthalmology consultation which is still pending today.  Patient states she 

was seen by the ophthalmologist today and we are awaiting his assessment and 

recommendations.  Patient is to be maintained on aspirin for secondary stroke 

prevention.  Cardiology is following the patient for first-degree heart block.  

Her cardiac medications are being adjusted by Dr. Potter.  Patient does show 

slight improvement in the degree of ptosis of the right eyelid.  Patient was 

able to complete MRI of the brain today.  Results of the MRI indicate scattered 

white matter ischemic changes bilaterally.  No evidence of acute stroke.  

Brainstem is normal with no evidence of brainstem stroke.  MRA of the [place] is reported normal.  Results of the MRI /MRA were reviewed today with 

the patient.  We have reviewed the recommendations from ophthalmology.  It is 

felt her findings more likely are due to local weakness of the right eye ocular 

muscles and will require outpatient ophthalmology follow-up.  Patient should be 

maintained on aspirin therapy for secondary stroke prevention.  After review of 

her MRI of the brain there is evidence of multiple white matter changes.  These 

are felt to be arterial sclerotic and vascular in nature.  This may be related 

to her history of uncontrolled hypertension in the past.  We would recommend 

the patient to be placed on Plavix 75 mg daily for secondary stroke prevention.

  She may discontinue the aspirin therapy.  Patient was seen by cardiology and 

they have adjusted her hypertensive medications.  She is now taking a 

combination of Norvasc and hydrochlorothiazide.  We recommend that she follow 

up with her primary care physician for close monitoring of her blood pressure.  

Patient may follow-up in the outpatient neurology clinic in 3-4 weeks.  We will 

continue close neurological follow-up for this patient during this admission.





Objective





- Vital Signs


Vital signs: 


 Vital Signs











Temp  98 F   04/05/17 12:00


 


Pulse  86   04/05/17 12:00


 


Resp  16   04/05/17 12:00


 


BP  116/77   04/05/17 12:00


 


Pulse Ox  93 L  04/05/17 08:00








 Intake & Output











 04/04/17 04/05/17 04/05/17





 18:59 06:59 18:59


 


Intake Total 1600 250 460


 


Output Total 250  


 


Balance 1350 250 460


 


Weight 102.7 kg 101.9 kg 


 


Intake:   


 


  Oral 1600 250 460


 


Output:   


 


  Urine 250  


 


Other:   


 


  Voiding Method Toilet Toilet Toilet


 


  # Voids 0 1 














- Exam





Physical examination:





PHYSICAL EXAMINATION: Patient is resting comfortably in bed. 


VITAL SIGNS: Blood pressure is [116/77]. Heart rate is [86]. Respiration is [16]

. Temperature is [98.0].


HEENT: Head is atraumatic, neck is supple, there were no carotid bruits.


CHEST: Lungs are clear to auscultation and percussion.  


CARDIAC: S1, S2 normal rate and rhythm. There is no murmur.


ABDOMEN: Soft and nontender. Bowel sounds are present.


EXTREMITIES:  There is no pedal edema.  Peripheral pulses are present.





Neurological examination:





Patient's neurological examination is unchanged from yesterday.  Patient 

continues to demonstrate ptosis of the right eyelid.





- Labs


CBC & Chem 7: 


 04/01/17 10:28





 04/02/17 06:12





Assessment and Plan


(1) Ptosis, right eyelid


Status: Acute   Code(s): H02.401 - UNSPECIFIED PTOSIS OF RIGHT EYELID   





(2) First degree AV block


Status: Acute   Code(s): I44.0 - ATRIOVENTRICULAR BLOCK, FIRST DEGREE   





(3) Hyperlipidemia


Status: Acute   Code(s): E78.5 - HYPERLIPIDEMIA, UNSPECIFIED   





(4) TIA (transient ischemic attack)


Status: Acute   Code(s): G45.9 - TRANSIENT CEREBRAL ISCHEMIC ATTACK, 

UNSPECIFIED   


Plan: 





This patient is a 66-year-old female who was admitted hospital with right 

eyelid ptosis.  Patient underwent MRI/MRA of the brain today the results of 

which are noted above.  MRI is negative for acute brainstem stroke or acute 

cortical stroke at this time.  Multiple chronic white matter ischemic changes 

are seen bilaterally.  Patient is recommended to continue on aspirin therapy 

for secondary stroke prevention.  MRA is normal with no evidence of any 

aneurysm.  We reviewed the results of the MRI/MRA with the patient today.  She 

is recommended to continue to follow-up with ophthalmology.  Ophthalmology 

consultation was noted today.  There is possibility that this may be a 

localized muscle this is since involving the superior orbital muscle of the 

right eye.  She is to follow-up with ophthalmology in outpatient clinic.  We 

did review the MRI films today.  There is multiple white matter changes felt to 

be ischemic in nature and chronic.  We would recommend tight control of her 

blood pressures this may be an underlying etiology for the ischemic changes 

over years.  Her blood pressure has responded well to the current combination 

of antihypertensive medications.  She should follow-up with her primary care 

physician in one to 2 weeks for further follow-up and management.  We would 

recommend the patient to start on Plavix 75 mg daily for secondary stroke 

prevention.  We would recommend tight control of her blood pressure.  She may 

follow-up in the outpatient neurology clinic in 3-4 weeks.  We will continue 

close neurological follow-up for this patient.  Overall prognosis at this time 

remains guarded.

## 2017-04-06 LAB — MIS TEST REQUESTED (BLOOD): (no result)

## 2017-04-07 NOTE — EEG
DATE OF SERVICE: 04/03/2017



INDICATIONS FOR EXAMINATION:   This patient is a 66-year-old female 

being evaluated for right-sided eyelid ptosis.   



AGE:   66Y



EEG FINDINGS: A routine 21-channel, awake digital EEG recording was 

accomplished utilizing the 10-20 international system with bipolar and 

referential montages. The background activity in the most alert 

resting state consists of a low to medium amplitude, fairly 

well-developed and well-sustained 7-8 Hz activity over the posterior 

head regions. This posterior rhythm attenuates to eye opening. There 

is a small amount of low amplitude 18-20 Hz beta activity seen 

maximally over the anterior head regions. Muscle and movement artifact 

was observed on a few occasions during the tracing.  



Hyperventilation was not performed. Photic stimulation at flash 

frequencies of 2-30 Hz produced a minimal occipital driving response. 

No epileptiform discharges were seen.  



IMPRESSION: This EEG is within normal limits for the patient's age. 

The EEG failed to reveal any focal, lateralized or epileptiform 

abnormalities. Clinical correlation is recommended.

## 2017-05-10 ENCOUNTER — HOSPITAL ENCOUNTER (OUTPATIENT)
Dept: HOSPITAL 47 - NEUROMAIN | Age: 67
Discharge: HOME | End: 2017-05-10
Payer: COMMERCIAL

## 2017-05-10 DIAGNOSIS — H81.11: Primary | ICD-10-CM

## 2017-05-10 DIAGNOSIS — H55.00: ICD-10-CM

## 2017-05-10 DIAGNOSIS — R53.1: ICD-10-CM

## 2017-05-10 PROCEDURE — 92537 CALORIC VSTBLR TEST W/REC: CPT

## 2017-05-10 PROCEDURE — 92540 BASIC VESTIBULAR EVALUATION: CPT

## 2017-05-23 NOTE — ENG
DATE OF SERVICE:  



ELECTRONYSTAGMOGRAPHIC REPORT







INDICATIONS FOR EXAMINATION: This patient is a 66 -year-old female 

being evaluated for symptoms of dizziness, lightheadedness and gait 

imbalance.  The patient has been symptomatic since April 1, 2017.  The 

patient also gives history of tinnitus involving both ears.   



CALIBRATION:   Within normal limits. 



SPONTANEOUS NYSTAGMUS:  No spontaneous nystagmus was seen. 



GAZE NYSTAGMUS:  No gaze evoked nystagmus was seen.  



SINUSOIDAL TRACKING TEST:  Smooth pursuit eye movements at both 10 and 

20 degrees per minute were slightly impaired.   Saccadic attempts at 

eye tracking was observed.   



OPTOKINETICS:  Optokinetics were asymmetrical with right beating 

nystagmus being better developed as compared to left breathing 

nystagmus at lower and higher target speeds bilaterally.    



LISA-HALLPIKE TEST:  The left lisa Hallpike maneuver did not produce any 

dizziness. No nystagmus was seen.  The right Brayton Hallpike maneuver did 

not produce any dizziness. No nystagmus was seen.   



POSITIONAL NYSTAGMUS:  Occasional right beating positional nystagmus 

was observed in two of six positions tested.    



CALORIC TEST: Bithermal alternating caloric irrigation revealed 

unilateral weakness on the left and directional preponderance to the 

right without failure of fixation suppression.     





IMPRESSION:  

This Electronystagmogram does not contain any spontaneous nystagmus. 

There were no gaze evoked nystagmus seen.   Smooth pursuit eye 

movements were slightly impaired. Saccadic attempts at eye tracking 

was observed.  Optokinetics were asymmetrical with right beating 

nystagmus being better developed as compared to left beating nystagmus 

at lower and higher target speeds bilaterally.  Brayton Hallpike maneuver 

was essentially negative.  Right beating positional nystagmus was 

observed in 2 of 6 positions tested.  Caloric testing revealed 

unilateral weakness on the left and directional preponderance to the 

right without failure of fixation suppression. The above findings 

favor a partially compensated left peripheral vestibular disturbance.  

Clinical correlation is recommended.

## 2020-07-22 ENCOUNTER — HOSPITAL ENCOUNTER (OUTPATIENT)
Dept: HOSPITAL 47 - RADMAMWWP | Age: 70
Discharge: HOME | End: 2020-07-22
Attending: FAMILY MEDICINE
Payer: COMMERCIAL

## 2020-07-22 DIAGNOSIS — Z12.31: Primary | ICD-10-CM

## 2020-07-22 PROCEDURE — 77067 SCR MAMMO BI INCL CAD: CPT

## 2020-07-23 NOTE — MM
Reason for exam: screening  (asymptomatic).

Last mammogram was performed 2 years and 7 months ago.



History:

Patient is postmenopausal.

Family history of breast cancer in mother at age 74.

Benign core biopsy of both breasts.

Took hormonal contraceptives for 1 year.  Took estrogen for 4 years.



Physical Findings:

A clinical breast exam by your physician is recommended on an annual basis and 

results should be correlated with mammographic findings.



MG Screening Mammo w CAD

Bilateral CC and MLO view(s) were taken.

Prior study comparison: December 15, 2017, bilateral MG screening mammo w CAD.  

May 12, 2016, bilateral MG diagnostic mammo w CAD [facility].

The breast tissue is heterogeneously dense. This may lower the sensitivity of 

mammography.

Finding #1: There is a 9 mm equal density (isodense), obscured lobulated mass in 

the upper outer quadrant of the left breast.

Finding #2: There are indeterminate grouped/clustered calcifications in the outer 

quadrant, central position of the left breast.





ASSESSMENT: Incomplete: need additional imaging evaluation, BI-RAD 0



RECOMMENDATION:

Special view mammogram of the left breast.



If lesion persists on supplemental views, image directed ultrasound is 

recommended.



[facility] will attempt to contact patient to return for supplemental 

views and ultrasound if indicated.

## 2020-07-31 ENCOUNTER — HOSPITAL ENCOUNTER (OUTPATIENT)
Dept: HOSPITAL 47 - RADMAMWWP | Age: 70
Discharge: HOME | End: 2020-07-31
Attending: FAMILY MEDICINE
Payer: COMMERCIAL

## 2020-07-31 DIAGNOSIS — R92.8: Primary | ICD-10-CM

## 2020-07-31 PROCEDURE — 77065 DX MAMMO INCL CAD UNI: CPT

## 2020-08-03 NOTE — USB
Reason for exam: additional evaluation requested from abnormal screening.



History:

Patient is postmenopausal.

Family history of breast cancer in mother at age 74.

Benign core biopsy of both breasts.

Took hormonal contraceptives for 1 year.  Took estrogen for 4 years.



US Breast Workup Limited LT

Left limited breast ultrasound including focal area of concern, retroareolar and 

axilla demonstrates a 0.6 x 0.6 x 0.4cm cystic lesion at 3 o'clock.



These results were verbally communicated with the patient and result sheet given 

to the patient on 7/31/20.





ASSESSMENT: Benign, BI-RAD 2



RECOMMENDATION:

Return to routine screening mammogram schedule for both breasts.

## 2020-08-03 NOTE — MM
Reason for exam: additional evaluation requested from abnormal screening.

Last mammogram was performed less than 1 month ago.



History:

Patient is postmenopausal.

Family history of breast cancer in mother at age 74.

Benign core biopsy of both breasts.

Took hormonal contraceptives for 1 year.  Took estrogen for 4 years.



Physical Findings:

Nurse did not find any significant physical abnormalities on exam.



MG Work Up Mamm w CAD LT

CC and MLO view(s) were taken of the left breast.

Prior study comparison: July 22, 2020, bilateral MG screening mammo w CAD.  

December 15, 2017, bilateral MG screening mammo w CAD.

The breast tissue is heterogeneously dense. This may lower the sensitivity of 

mammography.  Benign calcifications noted. 7mm nodule 3 o'clock left breast 7cm 

from nipple.



These results were verbally communicated with the patient and result sheet given 

to the patient on 7/31/20.





ASSESSMENT: Incomplete: need additional imaging evaluation, BI-RAD 0



RECOMMENDATION:

Ultrasound of the left breast.

## 2021-07-28 ENCOUNTER — HOSPITAL ENCOUNTER (OUTPATIENT)
Dept: HOSPITAL 47 - RADMAMWWP | Age: 71
Discharge: HOME | End: 2021-07-28
Attending: FAMILY MEDICINE
Payer: MEDICARE

## 2021-07-28 DIAGNOSIS — Z12.31: Primary | ICD-10-CM

## 2021-07-28 DIAGNOSIS — Z78.0: ICD-10-CM

## 2021-07-28 DIAGNOSIS — Z80.3: ICD-10-CM

## 2021-07-28 DIAGNOSIS — Z79.3: ICD-10-CM

## 2021-07-28 PROCEDURE — 77067 SCR MAMMO BI INCL CAD: CPT

## 2021-07-28 PROCEDURE — 77063 BREAST TOMOSYNTHESIS BI: CPT

## 2021-07-30 NOTE — MM
Reason for exam: screening  (asymptomatic).

Last mammogram was performed 1 year ago.



History:

Patient is postmenopausal.

Family history of breast cancer in mother at age 74.

Benign core biopsy of both breasts, 2001.

Took hormonal contraceptives for 1 year.  Took estrogen for 4 years.



Physical Findings:

A clinical breast exam by your physician is recommended on an annual basis and 

results should be correlated with mammographic findings.



MG 3D Screening Mammo W/Cad

Bilateral CC and MLO view(s) were taken.

Prior study comparison: July 22, 2020, bilateral MG screening mammo w CAD.  

December 15, 2017, bilateral MG screening mammo w CAD.

The breast tissue is heterogeneously dense. This may lower the sensitivity of 

mammography.  There is chronic nodularity in the left breast. Stable grouped and 

regional calcifications 3 o'clock central left breast since 2020.  No significant 

changes when compared with prior studies.





ASSESSMENT: Benign, BI-RAD 2



RECOMMENDATION:

Routine screening mammogram of both breasts in 1 year.

Patient should continue monthly self breast exams.

A negative report should not preclude additional follow up of suspicious palpable 

abnormalities.

## 2022-07-29 ENCOUNTER — HOSPITAL ENCOUNTER (OUTPATIENT)
Dept: HOSPITAL 47 - RADMAMWWP | Age: 72
Discharge: HOME | End: 2022-07-29
Attending: FAMILY MEDICINE
Payer: MEDICARE

## 2022-07-29 DIAGNOSIS — Z78.0: ICD-10-CM

## 2022-07-29 DIAGNOSIS — Z80.3: ICD-10-CM

## 2022-07-29 DIAGNOSIS — Z12.31: Primary | ICD-10-CM

## 2022-07-29 PROCEDURE — 77063 BREAST TOMOSYNTHESIS BI: CPT

## 2022-07-29 PROCEDURE — 77067 SCR MAMMO BI INCL CAD: CPT

## 2022-08-01 NOTE — MM
Reason for Exam: Screening  (asymptomatic). 

Last screening mammogram was performed 12 month(s) ago.





Patient History: 

Menarche at age 14. First Full-Term Pregnancy at age 19. Left ovary removed at age 47. Right ovary

removed at age 47. Hysterectomy at age 47. Postmenopausal. Patient used Estrogen for 4 years.

Patient used Hormonal Contraceptives for 1 year. 2001, Bilateral Benign Core Biopsy.

Mother had breast cancer, age 74. 





Risk Values: 

Jennie 5 year model risk: 3.8%.

NCI Lifetime model risk: 11.3%.





Prior Study Comparison: 

7/22/2020 Bilateral Screening Mammogram, [facility]. 7/31/2020 Left Diagnostic Mammogram, [facility]. 7/28/2021

Bilateral Screening Mammogram, [facility]. 





Tissue Density: 

The breast tissue is heterogeneously dense. This may lower the sensitivity of mammography.





Findings: 

Analyzed By CAD. 

There are benign-appearing round and vascular calcifications bilaterally redemonstrated. Several

small well circumscribed masses bilaterally are again seen on background dense tissue.

Benign-appearing right axillary lymph nodes are redemonstrated.



There is no suspicious group of microcalcifications or enlarging suspicious mass in either breast. 





Overall Assessment: Benign, BI-RAD 2





Management: 

Screening Mammogram of both breasts.

A clinical breast exam by your physician is recommended on an annual basis and results should be

correlated with mammographic findings.



Electronically signed and approved by: Bolivar Harris M.D.

## 2024-09-24 ENCOUNTER — HOSPITAL ENCOUNTER (OUTPATIENT)
Dept: HOSPITAL 47 - RADMAMWWP | Age: 74
Discharge: HOME | End: 2024-09-24
Attending: FAMILY MEDICINE
Payer: MEDICARE

## 2024-09-24 PROCEDURE — 77067 SCR MAMMO BI INCL CAD: CPT

## 2024-09-24 PROCEDURE — 77063 BREAST TOMOSYNTHESIS BI: CPT

## 2024-09-29 NOTE — MM
Reason for Exam: Screening  (asymptomatic). 

Last mammogram was performed 2 year(s) and 2 month(s) ago. 





Patient History: 

Menarche at age 14. First Full-Term Pregnancy at age 19. Left ovary removed at age 47. Right ovary

removed at age 47. Hysterectomy at age 47. Postmenopausal. Patient used Estrogen for 4 years.

Patient used Hormonal Contraceptives for 1 year. 2001, Bilateral Benign Core Biopsy.

Mother had breast cancer, age 74. 





Risk Values: 

Jennie 5 year model risk: 3.9%.

NCI Lifetime model risk: 9.8%.





Prior Study Comparison: 

7/31/2020 Left Diagnostic Mammogram, [facility]. 7/28/2021 Bilateral Screening Mammogram, [facility]. 7/29/2022

Bilateral MG 3D screening mammo w/cad, [facility]. 





Tissue Density: 

The breasts are heterogeneously dense, which may obscure small masses.





Findings: 

Analyzed By CAD. 

The pattern is symmetrical.

Appears stable. Benign vascular calcifications present bilaterally. There are some coarse

calcifications within the left breast. Nodularity is within the left breast. No significant interval

change is evident.

No suspicious groups of microcalcifications, spiculated or lobular masses, architectural distortion

or other secondary signs of malignancy are mammographically apparent. 





Overall Assessment: Benign, BI-RAD 2





Management: 

Screening Mammogram of both breasts in 1 year.

A negative mammogram report should not preclude additional follow up of suspicious palpable

abnormalities.

Patient should continue monthly self breast exam.

A clinical breast exam by your physician is recommended on an annual basis and results should be

correlated with mammographic findings.



Note on Jennie scores and lifetime risk:

1. A Jennie score greater than 3% is considered moderate risk. If this is the case, consider

specialist referral to assess eligibility for a risk reducing agent.

2. If overall lifetime risk for the development of breast cancer is 20% or higher, the patient may

qualify for future screening with alternating mammogram and breast MRI.



[facility], Workstation: [id number], 9/29/2024 12:04 PM.



Electronically signed and approved by: Sen Michael D.O. Radiologis